# Patient Record
Sex: FEMALE | Race: WHITE
[De-identification: names, ages, dates, MRNs, and addresses within clinical notes are randomized per-mention and may not be internally consistent; named-entity substitution may affect disease eponyms.]

---

## 2018-02-14 ENCOUNTER — HOSPITAL ENCOUNTER (EMERGENCY)
Dept: HOSPITAL 45 - C.EDB | Age: 28
Discharge: HOME | End: 2018-02-14
Payer: COMMERCIAL

## 2018-02-14 VITALS
DIASTOLIC BLOOD PRESSURE: 96 MMHG | OXYGEN SATURATION: 95 % | SYSTOLIC BLOOD PRESSURE: 126 MMHG | HEART RATE: 85 BPM | TEMPERATURE: 98.78 F

## 2018-02-14 VITALS
WEIGHT: 293 LBS | HEIGHT: 65 IN | HEIGHT: 65 IN | WEIGHT: 293 LBS | BODY MASS INDEX: 48.82 KG/M2 | BODY MASS INDEX: 48.82 KG/M2

## 2018-02-14 DIAGNOSIS — Z82.49: ICD-10-CM

## 2018-02-14 DIAGNOSIS — Z87.891: ICD-10-CM

## 2018-02-14 DIAGNOSIS — Z84.1: ICD-10-CM

## 2018-02-14 DIAGNOSIS — Z87.442: ICD-10-CM

## 2018-02-14 DIAGNOSIS — Z79.899: ICD-10-CM

## 2018-02-14 DIAGNOSIS — R10.32: Primary | ICD-10-CM

## 2018-02-14 DIAGNOSIS — Z80.9: ICD-10-CM

## 2018-02-14 DIAGNOSIS — Z83.3: ICD-10-CM

## 2018-02-14 LAB
ALBUMIN SERPL-MCNC: 3.9 GM/DL (ref 3.4–5)
ALP SERPL-CCNC: 74 U/L (ref 45–117)
ALT SERPL-CCNC: 24 U/L (ref 12–78)
AST SERPL-CCNC: 13 U/L (ref 15–37)
BASOPHILS # BLD: 0.02 K/UL (ref 0–0.2)
BASOPHILS NFR BLD: 0.2 %
BUN SERPL-MCNC: 11 MG/DL (ref 7–18)
CALCIUM SERPL-MCNC: 9.1 MG/DL (ref 8.5–10.1)
CO2 SERPL-SCNC: 27 MMOL/L (ref 21–32)
CREAT SERPL-MCNC: 0.72 MG/DL (ref 0.6–1.2)
EOS ABS #: 0.16 K/UL (ref 0–0.5)
EOSINOPHIL NFR BLD AUTO: 307 K/UL (ref 130–400)
GLUCOSE SERPL-MCNC: 76 MG/DL (ref 70–99)
HCT VFR BLD CALC: 40.2 % (ref 37–47)
HGB BLD-MCNC: 13.4 G/DL (ref 12–16)
IG#: 0.02 K/UL (ref 0–0.02)
IMM GRANULOCYTES NFR BLD AUTO: 24.5 %
LYMPHOCYTES # BLD: 2.25 K/UL (ref 1.2–3.4)
MCH RBC QN AUTO: 26.6 PG (ref 25–34)
MCHC RBC AUTO-ENTMCNC: 33.3 G/DL (ref 32–36)
MCV RBC AUTO: 79.8 FL (ref 80–100)
MONO ABS #: 0.56 K/UL (ref 0.11–0.59)
MONOCYTES NFR BLD: 6.1 %
NEUT ABS #: 6.18 K/UL (ref 1.4–6.5)
NEUTROPHILS # BLD AUTO: 1.7 %
NEUTROPHILS NFR BLD AUTO: 67.3 %
PMV BLD AUTO: 9.4 FL (ref 7.4–10.4)
POTASSIUM SERPL-SCNC: 3.7 MMOL/L (ref 3.5–5.1)
PROT SERPL-MCNC: 8 GM/DL (ref 6.4–8.2)
RED CELL DISTRIBUTION WIDTH CV: 14.1 % (ref 11.5–14.5)
RED CELL DISTRIBUTION WIDTH SD: 40.6 FL (ref 36.4–46.3)
SODIUM SERPL-SCNC: 141 MMOL/L (ref 136–145)
WBC # BLD AUTO: 9.19 K/UL (ref 4.8–10.8)

## 2018-02-14 NOTE — DIAGNOSTIC IMAGING REPORT
PELVIC COMPLETE NON OB



CLINICAL HISTORY: left pelvic pain PAIN



COMPARISON STUDY:  1/7/2016



FINDINGS: 

The uterus measured 7 cm.

The endometrial stripe measured 2 mm.

The right ovary measured 3.1 cm with normal vascular flow.

The left ovary measured 2.5 cm with normal vascular flow.

There is no ultrasonographic evidence of ovarian torsion. It should be noted

that ovarian torsion can be present with normal Doppler ultrasonographic

findings.

There was no evidence of pathologic free pelvic fluid.



IMPRESSION:  Normal pelvic ultrasound









The above report was generated using voice recognition software.  It may contain

grammatical, syntax or spelling errors.







Electronically signed by:  Osiel Stewart M.D.

2/14/2018 7:17 PM



Dictated Date/Time:  2/14/2018 7:16 PM

## 2018-02-14 NOTE — DIAGNOSTIC IMAGING REPORT
K[facility]



CLINICAL HISTORY: left flank pain pain



COMPARISON STUDY:  11/30/2015 



FINDINGS: Nonobstructive bowel pattern. No evidence for nephrocalcinosis. Pelvic

vascular calcifications unchanged in the prior study.



IMPRESSION:  Negative study. 













The above report was generated using voice recognition software.  It may contain

grammatical, syntax or spelling errors.







Electronically signed by:  Osiel Stewart M.D.

2/14/2018 7:23 PM



Dictated Date/Time:  2/14/2018 7:22 PM

## 2018-02-14 NOTE — EMERGENCY ROOM VISIT NOTE
History


First contact with patient:  16:58


Chief Complaint:  KIDNEY STONE


Stated Complaint:  BACK PAIN, PELVIC CRAMPS, KIDNEY STONE





History of Present Illness


The patient is a 27 year old female who presents to the Emergency Room with 

complaints of "I think I have a kidney stone".  The patient is experiencing 

left lower quadrant abdominal pain which is radiating into her back.  She 

describes the pain as a squeezing sensation.  She states for the past 3 weeks, 

she has had dull pain in her abdomen, on the left side, but yesterday, the pain 

seemed to significantly worsen. The patient states she has history of kidney 

stones, and has had similar symptoms in the past.  She does report some dysuria 

which began last night.  She states this morning, she did have a fever of 101

F.  She did take some ibuprofen which helped with the fever and minimally 

helped with her pain.  The patient does state she got an influenza and Gardasil 

vaccinations yesterday, and is concerned that this could be a side effect.  She 

states she did also restart her Zoloft today.  The patient denies any nausea, 

vomiting, diarrhea, or constipation associated with her symptoms.  She states 

there has been some dysuria, but denies any urinary frequency, hesitancy, or 

hematuria. She denies any recent illness including upper respiratory infection, 

cough, sore throat, or other concerning symptoms.





Review of Systems


A complete 10 point review of systems was reviewed with the patient with 

pertinent positives and negatives as per history of present illness. All else 

were negative.





Past Medical/Surgical History


Medical Problems:


(1) Bronchitis


(2) Kidney stones


(3) Ureterolithiasis








Family History





Cancer


Diabetes mellitus


Gallbladder disease


Heart disease


Hypertension


Kidney disease


Kidney stones





Social History


Smoking Status:  Former Smoker


Alcohol Use:  none


Drug Use:  none


Marital Status:  single, in relationship


Housing Status:  lives with family


Occupation Status:  employed





Current/Historical Medications


Scheduled


Multivit/Min/Iron/Fol Ac/Pren (Prenatal Vitamin), 1 TAB PO DAILY


Sertraline HCl (Sertraline HCl), 25 MG PO DAILY





Scheduled PRN


Albuterol Hfa (Ventolin Hfa), 2 PUFFS INH UD PRN for SOB/Wheezing





Physical Exam


Vital Signs











  Date Time  Temp Pulse Resp B/P (MAP) Pulse Ox O2 Delivery O2 Flow Rate FiO2


 


2/14/18 19:45 37.1 85 18 126/96 95   


 


2/14/18 19:31  85 18 126/96 95 Room Air  


 


2/14/18 17:38  73 19 135/91 99 Room Air  


 


2/14/18 16:08 37.1 87 16 174/93 97 Room Air  











Physical Exam


VITALS: Vitals are noted on the nurse's note and reviewed by myself.  Vital 

signs stable.


GENERAL: This is a 27-year-old obese white female, in no acute distress, 

nondiaphoretic, well-developed well-nourished.


SKIN: The skin was without rashes, erythema, edema, or bruising.  There is no 

tenting of the skin.  Capillary reflex less than 2 seconds.


HEAD: Normocephalic atraumatic.  


EARS: External auditory canals clear, tympanic membranes pearly gray without 

erythema or effusion bilaterally.


EYES: Pupils equal round and reactive to light and accommodation.  Conjunctivae 

without injection, sclerae without icterus.  Extraocular movements intact.  


NOSE: Patent, turbinates without inflammation or discharge.  No sinus 

tenderness.


MOUTH: Mucous membranes moist.  Tonsils are not enlarged.  Pharynx without 

erythema or exudate.  Uvula midline.  Airway patent.  Tongue does not deviate.  


NECK: Supple without nuchal rigidity.  No lymphadenopathy.  No thyromegaly.  

Cervical spine is nontender.  No JVD.


HEART: Regular rate and rhythm without murmurs gallops or rubs.


LUNGS: Clear to auscultation bilaterally without wheezes, rales or rhonchi.  No 

dullness to percussion.  No retractions or accessory muscle use.


ABDOMEN: Positive bowel sounds x 4.  Normal tympanic percussion.  Tenderness of 

the left lower quadrant in the suprapubic region.  Soft, without masses or 

organomegaly.  Ordoñez sign negative.  No guarding or rebound tenderness.  

Positive CVA tenderness on the left.


MUSCULOSKELETAL: No muscle atrophy, erythema, or edema noted.  Full range of 

motion without joint tenderness in all extremities.  No tenderness to 

palpation.  Normal gait.  Strength 5/5 throughout.


NEURO: Patient was alert and oriented to person place and time.  Normal 

sensation to light and sharp touch.  Deep tendon reflexes 2+ throughout.  No 

focal neurological deficits.





Medical Decision & Procedures


ER Provider


Diagnostic Interpretation:


KUB





CLINICAL HISTORY: left flank pain pain





COMPARISON STUDY:  11/30/2015 





FINDINGS: Nonobstructive bowel pattern. No evidence for nephrocalcinosis. Pelvic


vascular calcifications unchanged in the prior study.





IMPRESSION:  Negative study. 




















The above report was generated using voice recognition software.  It may contain


grammatical, syntax or spelling errors.











Electronically signed by:  Osiel Stewart M.D.


2/14/2018 7:23 PM





Dictated Date/Time:  2/14/2018 7:22 PM








PELVIC COMPLETE NON OB





CLINICAL HISTORY: left pelvic pain PAIN





COMPARISON STUDY:  1/7/2016





FINDINGS: 


The uterus measured 7 cm.


The endometrial stripe measured 2 mm.


The right ovary measured 3.1 cm with normal vascular flow.


The left ovary measured 2.5 cm with normal vascular flow.


There is no ultrasonographic evidence of ovarian torsion. It should be noted


that ovarian torsion can be present with normal Doppler ultrasonographic


findings.


There was no evidence of pathologic free pelvic fluid.





IMPRESSION:  Normal pelvic ultrasound














The above report was generated using voice recognition software.  It may contain


grammatical, syntax or spelling errors.











Electronically signed by:  Osiel Stewart M.D.


2/14/2018 7:17 PM





Dictated Date/Time:  2/14/2018 7:16 PM





Laboratory Results


2/14/18 17:58








Red Blood Count 5.04, Mean Corpuscular Volume 79.8, Mean Corpuscular Hemoglobin 

26.6, Mean Corpuscular Hemoglobin Concent 33.3, Mean Platelet Volume 9.4, 

Neutrophils (%) (Auto) 67.3, Lymphocytes (%) (Auto) 24.5, Monocytes (%) (Auto) 

6.1, Eosinophils (%) (Auto) 1.7, Basophils (%) (Auto) 0.2, Neutrophils # (Auto) 

6.18, Lymphocytes # (Auto) 2.25, Monocytes # (Auto) 0.56, Eosinophils # (Auto) 

0.16, Basophils # (Auto) 0.02





2/14/18 17:58

















Test


  2/14/18


17:10 2/14/18


17:58


 


Urine Color YELLOW  


 


Urine Appearance CLEAR (CLEAR)  


 


Urine pH 5.0 (4.5-7.5)  


 


Urine Specific Gravity


  1.023


(1.000-1.030) 


 


 


Urine Protein NEG (NEG)  


 


Urine Glucose (UA) NEG (NEG)  


 


Urine Ketones NEG (NEG)  


 


Urine Occult Blood NEG (NEG)  


 


Urine Nitrite NEG (NEG)  


 


Urine Bilirubin NEG (NEG)  


 


Urine Urobilinogen NEG (NEG)  


 


Urine Leukocyte Esterase NEG (NEG)  


 


White Blood Count


  


  9.19 K/uL


(4.8-10.8)


 


Red Blood Count


  


  5.04 M/uL


(4.2-5.4)


 


Hemoglobin


  


  13.4 g/dL


(12.0-16.0)


 


Hematocrit  40.2 % (37-47) 


 


Mean Corpuscular Volume


  


  79.8 fL


()


 


Mean Corpuscular Hemoglobin


  


  26.6 pg


(25-34)


 


Mean Corpuscular Hemoglobin


Concent 


  33.3 g/dl


(32-36)


 


Platelet Count


  


  307 K/uL


(130-400)


 


Mean Platelet Volume


  


  9.4 fL


(7.4-10.4)


 


Neutrophils (%) (Auto)  67.3 % 


 


Lymphocytes (%) (Auto)  24.5 % 


 


Monocytes (%) (Auto)  6.1 % 


 


Eosinophils (%) (Auto)  1.7 % 


 


Basophils (%) (Auto)  0.2 % 


 


Neutrophils # (Auto)


  


  6.18 K/uL


(1.4-6.5)


 


Lymphocytes # (Auto)


  


  2.25 K/uL


(1.2-3.4)


 


Monocytes # (Auto)


  


  0.56 K/uL


(0.11-0.59)


 


Eosinophils # (Auto)


  


  0.16 K/uL


(0-0.5)


 


Basophils # (Auto)


  


  0.02 K/uL


(0-0.2)


 


RDW Standard Deviation


  


  40.6 fL


(36.4-46.3)


 


RDW Coefficient of Variation


  


  14.1 %


(11.5-14.5)


 


Immature Granulocyte % (Auto)  0.2 % 


 


Immature Granulocyte # (Auto)


  


  0.02 K/uL


(0.00-0.02)


 


Anion Gap


  


  7.0 mmol/L


(3-11)


 


Est Creatinine Clear Calc


Drug Dose 


  176.1 ml/min 


 


 


Estimated GFR (


American) 


  134.0 


 


 


Estimated GFR (Non-


American 


  115.6 


 


 


BUN/Creatinine Ratio  15.4 (10-20) 


 


Calcium Level


  


  9.1 mg/dl


(8.5-10.1)


 


Total Bilirubin


  


  0.2 mg/dl


(0.2-1)


 


Aspartate Amino Transf


(AST/SGOT) 


  13 U/L (15-37) 


 


 


Alanine Aminotransferase


(ALT/SGPT) 


  24 U/L (12-78) 


 


 


Alkaline Phosphatase


  


  74 U/L


()


 


Total Protein


  


  8.0 gm/dl


(6.4-8.2)


 


Albumin


  


  3.9 gm/dl


(3.4-5.0)


 


Globulin


  


  4.1 gm/dl


(2.5-4.0)


 


Albumin/Globulin Ratio  0.9 (0.9-2) 











Medications Administered











 Medications


  (Trade)  Dose


 Ordered  Sig/Dave


 Route  Start Time


 Stop Time Status Last Admin


Dose Admin


 


 Ketorolac


 Tromethamine


  (Toradol Inj)  30 mg  NOW  STAT


 IV  2/14/18 17:15


 2/14/18 17:22 DC 2/14/18 17:36


30 MG


 


 Sodium Chloride  1,000 ml @ 


 999 mls/hr  Q1H1M STAT


 IV  2/14/18 17:15


 2/14/18 18:15 DC 2/14/18 17:15


999 MLS/HR


 


 Tramadol HCl


  (Ultram Tab)  50 mg  NOW  STAT


 PO  2/14/18 19:35


 2/14/18 19:36 DC 2/14/18 19:40


50 MG











ED Course


Patient was seen and evaluated as above.





IV access obtained, labs drawn.  The patient was given 30 mg Toradol and 1 L 

normal saline solution IV.





KUB and Pelvis U/S ordered and performed.  This was reviewed by myself and the 

radiologist.





I discussed the findings of testing with the patient at bedside.  She continues 

to complain of pain.  She was given 50 mg tramadol p.o.





Discharge instructions reviewed, the patient was discharged home in good 

condition.





Medical Decision


This is a 27-year-old female patient presents to the emergency department today 

complaining of left lower quadrant abdominal pain radiating into the back which 

is consistent with prior pain she has had with kidney stones.  The patient's 

workup here in the emergency department was overall negative for stone.  

Urinalysis did not show any blood or signs of infection. CBC without 

leukocytosis, anemia, thrombocytopenia.  Urine pregnancy test was negative.  

CMP did not show any renal, hepatic, or electrolyte abnormalities.  The patient 

states she has also had a history of ovarian cyst which seemed to present 

similarly to kidney stone in the past.  Ultrasound of the pelvis was negative 

for ovarian cyst.  I did discuss options for further evaluation with the 

patient including CT scan of the abdomen and pelvis, but did discuss with her 

the benefits versus risks associated with this scan.  As her labs do not reveal 

any abnormalities, I do not feel that this test is necessary at this time.  I 

am uncertain exactly as to the etiology of her abdominal pain, but I did 

encourage close follow-up with strict return precautions.  The patient is 

agreeable to this assessment and plan, and decides that she does not wish to 

have the CT scan performed.  She states she is in slightly more pain since 

having the transvaginal ultrasound, and does request pain medication.  She was 

given a dose of tramadol and discharged home in good condition.





Etiologies such as appendicitis, diverticulitis,  obstruction, inflammatory 

bowel disease, renal colic, PUD, biliary pathology, pancreatitis, mesenteric 

ischemia, aortic pathology, infections, genitourinary, UTI, perforated viscus, 

as well as others were entertained.





Medication Reconcilliation


Current Medication List:  was personally reviewed by me





Blood Pressure Screening


Patient's blood pressure:  Normal blood pressure





Impression





 Primary Impression:  


 Abdominal pain





Departure Information


Dispostion


Home / Self-Care





Condition


GOOD





Referrals


No Doctor, Assigned (PCP)





Patient Instructions


ED Abdominal Pain Unkn Cause, My [facility]





Additional Instructions





You have been treated in the Emergency Department your Abdominal Pain. 

Laboratory results and imaging studies have ruled out any emergent causes for 

your abdominal pain which would warrant admission or surgery.  There is no 

ovarian cyst noted on ultrasound, nor was there a stone noted on KUB x-ray.





For pain control, you can use the following over-the-counter medicines (if >13 yo):


Ibuprofen(Motrin, Advil) may be used for fever or pain.  Use 600mg every six 

hours as needed.  Take with food.  Avoid using more than 2400mg in a 24 hour 

period.  Do not use 2400mg per day for more than three consecutive days without 

physician direction.  Prolonged inappropriate use can lead to stomach upset or 

ulcers. 


(AND/OR)


Acetaminophen(Tylenol) may be used for fever or pain.  Use 1000mg every six 

hours as needed.  Avoid using more than 3000mg in a 24 hour period.  





Drink plenty of water and stay well hydrated. 





As with any trip to the Emergency Department, you should follow-up with your 

Primary Care Provider from today's visit.





Return to the emergency department if your symptoms persist despite treatment 

plan outlined above or if the following symptoms occur: worsening abdominal pain

, increased fevers, chills, worsening nausea/vomiting, blood in your stool or 

urine.





Problem Qualifiers








 Primary Impression:  


 Abdominal pain


 Abdominal location:  left lower quadrant  Qualified Codes:  R10.32 - Left 

lower quadrant pain

## 2018-08-06 ENCOUNTER — HOSPITAL ENCOUNTER (EMERGENCY)
Dept: HOSPITAL 45 - C.EDB | Age: 28
LOS: 1 days | Discharge: HOME | End: 2018-08-07
Payer: COMMERCIAL

## 2018-08-06 VITALS
HEIGHT: 64.02 IN | BODY MASS INDEX: 50.02 KG/M2 | WEIGHT: 293 LBS | BODY MASS INDEX: 50.02 KG/M2 | WEIGHT: 293 LBS | HEIGHT: 64.02 IN

## 2018-08-06 VITALS — TEMPERATURE: 97.88 F

## 2018-08-06 DIAGNOSIS — S00.511A: ICD-10-CM

## 2018-08-06 DIAGNOSIS — R09.81: ICD-10-CM

## 2018-08-06 DIAGNOSIS — R60.0: ICD-10-CM

## 2018-08-06 DIAGNOSIS — Z87.891: ICD-10-CM

## 2018-08-06 DIAGNOSIS — E86.0: ICD-10-CM

## 2018-08-06 DIAGNOSIS — S06.0X1A: ICD-10-CM

## 2018-08-06 DIAGNOSIS — R55: Primary | ICD-10-CM
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
## 2018-08-06 DIAGNOSIS — Z88.6: ICD-10-CM

## 2018-08-06 NOTE — EMERGENCY ROOM VISIT NOTE
History


Report prepared by Lucian:  Adia Akins


Under the Supervision of:  Dr. Quincy Painter M.D.


First contact with patient:  23:11


Chief Complaint:  SYNCOPE


Stated Complaint:  HIT HEAD LOOPY





History of Present Illness


The patient is a 27 year old female who presents to the Emergency Room with 

complaints of a resolved episode of syncope that occurred about an hour prior 

to arrival. The patient's  reports he heard a "thud" and then went 

upstairs to find her on the floor with blood coming from her face.  The patient 

states she has a headache and her neck is sore. She rates her pain a 6/10 in 

severity. She also complains of chest tightness, nausea, nasal congestion, and 

more swelling in her feet. She states she has had a cold for the last few days. 

She reports she has been taking Zyrtec but has run out. She denies abdominal 

pain. She denies a history of seizures or blood clots. She notes there is a 

family history of blood clots. She states she has a history of asthma. The 

patient reports she was recently diagnosed with C-Diff and is taking 

Vancomycin. She denies diarrhea. She states she has not been drinking a lot of 

fluids.





   Source of History:  patient


   Onset:  an hour PTA


   Position:  head


   Symptom Intensity:  6/10


   Timing:  resolved


   Associated Symptoms:  + headache, + neck pain, + chest pain, + nausea, No 

abdominal pain, No diarrhea





Review of Systems


See HPI for pertinent positives & negatives. A total of 10 systems reviewed and 

were otherwise negative.





Past Medical & Surgical


Medical Problems:


(1) Bronchitis


(2) Kidney stones


(3) Ureterolithiasis








Family History





Cancer


Diabetes mellitus


Gallbladder disease


Heart disease


Hypertension


Kidney disease


Kidney stones





Social History


Smoking Status:  Former Smoker


Alcohol Use:  none


Drug Use:  none


Marital Status:  single, in relationship


Housing Status:  lives with family


Occupation Status:  employed





Current/Historical Medications


Scheduled


Cephalexin Monohydrate (Keflex), 500 MG PO BID


Sertraline HCl (Sertraline HCl), 25 MG PO DAILY


Vancomycin HCl (Firvanq), 50 MG PO AS DIRECTED





Scheduled PRN


Acetaminophen (Tylenol), 650 MG PO Q4 PRN for Pain


Albuterol Hfa (Ventolin Hfa), 2 PUFFS INH UD PRN for SOB/Wheezing


Ondansetron Hcl (Zofran), 4 MG PO Q6 PRN for Nausea





Allergies


Coded Allergies:  


     Diphenhydramine (Verified  Allergy, Intermediate, hives, 8/6/18)





Physical Exam


Vital Signs











  Date Time  Temp Pulse Resp B/P (MAP) Pulse Ox O2 Delivery O2 Flow Rate FiO2


 


8/7/18 01:40  80 20 123/63 98   


 


8/7/18 00:59  77 20 113/58 97 Room Air  


 


8/7/18 00:19  80 22 101/58 96 Room Air  


 


8/6/18 23:04 36.6 86 18 147/85 99 Room Air  











Physical Exam


GENERAL: Patient is mildy anxious appearing and in no acute distress.


EYES: No scleral icterus, unremarkable pupils.


ENT: Mucous membranes moist, no nasal congestion. Abrasion of the left lower 

lip. 


NECK: No masses appreciated, no meningismus, trachea is midline.


RESPIRATORY: No dyspnea. Clear to auscultation and equal bilaterally. No wheeze

, no rhonchi.


CARDIOVASCULAR: Regular rate and rhythm.  No murmurs, rubs, gallops appreciated.


GASTROINTESTINAL: Abdomen soft, nontender, no peritonitis.  Bowel sounds 

positive.  No masses appreciated.


BACK: No midline tenderness, no CVA tenderness


EXTREMITIES: Normal motion all extremities, no cyanosis. Bilateral foot edema. 


NEUROLOGIC: Alert and oriented, no acute motor or sensory deficits, no focal 

weakness, cranial nerves grossly intact.


SKIN: No rash, no jaundice, no diaphoresis.





Medical Decision & Procedures


ER Provider


Diagnostic Interpretation:


Stat Rad Radiology results and stated below per my review and radiologist 

interpretation:


CT HEAD:


No ICH, mass effect or edema. No evidence for cortical infarct or significant 

interval change from 4/10/2016.


No skull fracture. Paranasal sinuses and mastoid air cells are unremarkable. No 

significant overlying soft tissue abnormality. 


Radiologist:   Jerome Neil MD





Laboratory Results


8/6/18 23:55








Red Blood Count 4.65, Mean Corpuscular Volume 80.6, Mean Corpuscular Hemoglobin 

26.2, Mean Corpuscular Hemoglobin Concent 32.5, Mean Platelet Volume 9.8, 

Neutrophils (%) (Auto) 64.9, Lymphocytes (%) (Auto) 22.6, Monocytes (%) (Auto) 

7.2, Eosinophils (%) (Auto) 4.9, Basophils (%) (Auto) 0.2, Neutrophils # (Auto) 

5.78, Lymphocytes # (Auto) 2.02, Monocytes # (Auto) 0.64, Eosinophils # (Auto) 

0.44, Basophils # (Auto) 0.02





8/7/18 00:39

















Test


  8/6/18


23:15 8/6/18


23:55 8/7/18


00:39


 


Urine Color YELLOW   


 


Urine Appearance CLEAR (CLEAR)   


 


Urine pH 5.0 (4.5-7.5)   


 


Urine Specific Gravity


  1.025


(1.000-1.030) 


  


 


 


Urine Protein NEG (NEG)   


 


Urine Glucose (UA) NEG (NEG)   


 


Urine Ketones NEG (NEG)   


 


Urine Occult Blood NEG (NEG)   


 


Urine Nitrite NEG (NEG)   


 


Urine Bilirubin NEG (NEG)   


 


Urine Urobilinogen NEG (NEG)   


 


Urine Leukocyte Esterase SMALL (NEG)   


 


Urine WBC (Auto)


  10-30 /hpf


(0-5) 


  


 


 


Urine RBC (Auto) 0-4 /hpf (0-4)   


 


Urine Hyaline Casts (Auto) 1-5 /lpf (0-5)   


 


Urine Epithelial Cells (Auto) >30 /lpf (0-5)   


 


Urine Bacteria (Auto) 1+ (NEG)   


 


Urine Crystals


  CALCIUM


OXALATE (NONE 


  


 


 


Urine Pregnancy Test NEG (NEG)   


 


White Blood Count


  


  8.92 K/uL


(4.8-10.8) 


 


 


Red Blood Count


  


  4.65 M/uL


(4.2-5.4) 


 


 


Hemoglobin


  


  12.2 g/dL


(12.0-16.0) 


 


 


Hematocrit  37.5 % (37-47)  


 


Mean Corpuscular Volume


  


  80.6 fL


() 


 


 


Mean Corpuscular Hemoglobin


  


  26.2 pg


(25-34) 


 


 


Mean Corpuscular Hemoglobin


Concent 


  32.5 g/dl


(32-36) 


 


 


Platelet Count


  


  289 K/uL


(130-400) 


 


 


Mean Platelet Volume


  


  9.8 fL


(7.4-10.4) 


 


 


Neutrophils (%) (Auto)  64.9 %  


 


Lymphocytes (%) (Auto)  22.6 %  


 


Monocytes (%) (Auto)  7.2 %  


 


Eosinophils (%) (Auto)  4.9 %  


 


Basophils (%) (Auto)  0.2 %  


 


Neutrophils # (Auto)


  


  5.78 K/uL


(1.4-6.5) 


 


 


Lymphocytes # (Auto)


  


  2.02 K/uL


(1.2-3.4) 


 


 


Monocytes # (Auto)


  


  0.64 K/uL


(0.11-0.59) 


 


 


Eosinophils # (Auto)


  


  0.44 K/uL


(0-0.5) 


 


 


Basophils # (Auto)


  


  0.02 K/uL


(0-0.2) 


 


 


RDW Standard Deviation


  


  40.4 fL


(36.4-46.3) 


 


 


RDW Coefficient of Variation


  


  13.9 %


(11.5-14.5) 


 


 


Immature Granulocyte % (Auto)  0.2 %  


 


Immature Granulocyte # (Auto)


  


  0.02 K/uL


(0.00-0.02) 


 


 


D-Dimer


  


  


  450 ug/L FEU


(0-500)


 


Anion Gap


  


  


  8.0 mmol/L


(3-11)


 


Est Creatinine Clear Calc


Drug Dose 


  


  183.7 ml/min 


 


 


Estimated GFR (


American) 


  


  139.9 


 


 


Estimated GFR (Non-


American 


  


  120.7 


 


 


BUN/Creatinine Ratio   18.6 (10-20) 


 


Calcium Level


  


  


  8.1 mg/dl


(8.5-10.1)


 


Troponin I


  


  


  < 0.015 ng/ml


(0-0.045)





Laboratory results as reviewed by me.





Medications Administered











 Medications


  (Trade)  Dose


 Ordered  Sig/Dave


 Route  Start Time


 Stop Time Status Last Admin


Dose Admin


 


 Sodium Chloride  1,000 ml @ 


 999 mls/hr  Q1H1M STAT


 IV  8/6/18 23:19


 8/7/18 00:19 DC 8/7/18 00:19


999 MLS/HR


 


 Ketorolac


 Tromethamine


  (Toradol Inj)  30 mg  NOW  STAT


 IV  8/7/18 01:27


 8/7/18 01:28 DC 8/7/18 01:33


30 MG











ECG Per My Interpretation


Indication:  syncope


Rate (beats per minute):  80


Rhythm:  normal sinus


Findings:  no acute ischemic change, no ectopy, other ()





ED Course


2315: The patient was evaluated in room B7. A complete history and physical 

exam was performed.





0130:  I rechecked the patient. She is feeling better. She states she still has 

headache. I discussed post concussion instructions and syncope instructions. 





0135: Reevaluated the patient. Discussed results and discharge instructions: 

She verbalized understanding and agreement.   The patient is ready for 

discharge.





Medical Decision


Differential: Vaso-vagal, Intracerebral Event, Neurologic, Infectious, Volume 

Deficiency, Hypoglycemia, Electrolyte Abnormality, Cardiac Source, Toxicologic, 

amongst other pathologies entertained.





27 yr old female arrives with complaint of syncopal event, striking lip and 

mild edema bilateral feet.  Just finished course abx for cdiff and has recent 

sinus congestion.  CT head negative.  Dimer negative and legs do not appear 

very much swollen.  No evidence this is acs.  Electrolytes look OK.  Suspect 

some dehydration leading to syncope with standing.  She is feeling much better 

after fluids.  She will rest and we discussed at length post head injury 

instructions.





Medication Reconcilliation


Current Medication List:  was personally reviewed by me





Blood Pressure Screening


Patient's blood pressure:  Normal blood pressure





Impression





 Primary Impression:  


 Head injury, closed, with brief LOC


 Additional Impressions:  


 Concussion


 Abrasion of lip


 Dehydration


 Sinus congestion





Scribe Attestation


The scribe's documentation has been prepared under my direction and personally 

reviewed by me in its entirety. I confirm that the note above accurately 

reflects all work, treatment, procedures, and medical decision making performed 

by me.





Departure Information


Dispostion


Home / Self-Care





Referrals


No Doctor, Assigned (PCP)





Patient Instructions


Concussion, Fainting (Syncope) - [facility], My [facility]





Problem Qualifiers

## 2018-08-07 VITALS — SYSTOLIC BLOOD PRESSURE: 123 MMHG | OXYGEN SATURATION: 98 % | HEART RATE: 80 BPM | DIASTOLIC BLOOD PRESSURE: 63 MMHG

## 2018-08-07 LAB
BASOPHILS # BLD: 0.02 K/UL (ref 0–0.2)
BASOPHILS NFR BLD: 0.2 %
BUN SERPL-MCNC: 13 MG/DL (ref 7–18)
CALCIUM SERPL-MCNC: 8.1 MG/DL (ref 8.5–10.1)
CO2 SERPL-SCNC: 25 MMOL/L (ref 21–32)
CREAT SERPL-MCNC: 0.68 MG/DL (ref 0.6–1.2)
EOS ABS #: 0.44 K/UL (ref 0–0.5)
EOSINOPHIL NFR BLD AUTO: 289 K/UL (ref 130–400)
GLUCOSE SERPL-MCNC: 125 MG/DL (ref 70–99)
HCT VFR BLD CALC: 37.5 % (ref 37–47)
HGB BLD-MCNC: 12.2 G/DL (ref 12–16)
IG#: 0.02 K/UL (ref 0–0.02)
IMM GRANULOCYTES NFR BLD AUTO: 22.6 %
LYMPHOCYTES # BLD: 2.02 K/UL (ref 1.2–3.4)
MCH RBC QN AUTO: 26.2 PG (ref 25–34)
MCHC RBC AUTO-ENTMCNC: 32.5 G/DL (ref 32–36)
MCV RBC AUTO: 80.6 FL (ref 80–100)
MONO ABS #: 0.64 K/UL (ref 0.11–0.59)
MONOCYTES NFR BLD: 7.2 %
NEUT ABS #: 5.78 K/UL (ref 1.4–6.5)
NEUTROPHILS # BLD AUTO: 4.9 %
NEUTROPHILS NFR BLD AUTO: 64.9 %
PMV BLD AUTO: 9.8 FL (ref 7.4–10.4)
POTASSIUM SERPL-SCNC: 3.5 MMOL/L (ref 3.5–5.1)
RED CELL DISTRIBUTION WIDTH CV: 13.9 % (ref 11.5–14.5)
RED CELL DISTRIBUTION WIDTH SD: 40.4 FL (ref 36.4–46.3)
SODIUM SERPL-SCNC: 142 MMOL/L (ref 136–145)
WBC # BLD AUTO: 8.92 K/UL (ref 4.8–10.8)

## 2018-08-07 NOTE — DIAGNOSTIC IMAGING REPORT
CT OF THE HEAD WITHOUT CONTRAST



CLINICAL HISTORY: Syncope. Head injury.    



COMPARISON STUDY:  Head CT April 10, 2016. 



CT DOSE: 537.48 mGy.cm



TECHNIQUE: Helical axial images of the head were obtained without IV contrast.

Automated exposure control was utilized for the study.  A dose lowering

technique was utilized adhering to the principles of ALARA.





FINDINGS: No acute intracranial hemorrhage, midline shift or mass effect is

present. Ventricular system is unremarkable. Basilar cisterns are patent. There

are no extra-axial collections. Gray-white differentiation is maintained. There

is no calvarial fracture.



IMPRESSION:  



1. No acute intracranial findings.



2. No calvarial fracture. 







Electronically signed by:  Joshua Butt M.D.

8/7/2018 7:03 AM



Dictated Date/Time:  8/7/2018 7:01 AM

## 2018-08-12 ENCOUNTER — HOSPITAL ENCOUNTER (EMERGENCY)
Dept: HOSPITAL 45 - C.EDB | Age: 28
Discharge: HOME | End: 2018-08-12
Payer: COMMERCIAL

## 2018-08-12 VITALS — OXYGEN SATURATION: 98 %

## 2018-08-12 VITALS
HEIGHT: 64.02 IN | HEIGHT: 64.02 IN | WEIGHT: 293 LBS | BODY MASS INDEX: 50.02 KG/M2 | WEIGHT: 293 LBS | BODY MASS INDEX: 50.02 KG/M2

## 2018-08-12 VITALS — TEMPERATURE: 97.52 F

## 2018-08-12 VITALS — HEART RATE: 79 BPM | SYSTOLIC BLOOD PRESSURE: 121 MMHG | DIASTOLIC BLOOD PRESSURE: 85 MMHG | OXYGEN SATURATION: 97 %

## 2018-08-12 DIAGNOSIS — J45.41: Primary | ICD-10-CM

## 2018-08-12 DIAGNOSIS — Z87.09: ICD-10-CM

## 2018-08-12 LAB
ALBUMIN SERPL-MCNC: 3.9 GM/DL (ref 3.4–5)
ALP SERPL-CCNC: 78 U/L (ref 45–117)
ALT SERPL-CCNC: 26 U/L (ref 12–78)
AST SERPL-CCNC: 12 U/L (ref 15–37)
BASOPHILS # BLD: 0.02 K/UL (ref 0–0.2)
BASOPHILS NFR BLD: 0.2 %
BUN SERPL-MCNC: 14 MG/DL (ref 7–18)
CALCIUM SERPL-MCNC: 9.1 MG/DL (ref 8.5–10.1)
CO2 SERPL-SCNC: 23 MMOL/L (ref 21–32)
CREAT SERPL-MCNC: 0.92 MG/DL (ref 0.6–1.2)
EOS ABS #: 0.24 K/UL (ref 0–0.5)
EOSINOPHIL NFR BLD AUTO: 239 K/UL (ref 130–400)
GLUCOSE SERPL-MCNC: 113 MG/DL (ref 70–99)
HCT VFR BLD CALC: 36.9 % (ref 37–47)
HGB BLD-MCNC: 12.1 G/DL (ref 12–16)
IG#: 0.01 K/UL (ref 0–0.02)
IMM GRANULOCYTES NFR BLD AUTO: 22.1 %
LYMPHOCYTES # BLD: 1.94 K/UL (ref 1.2–3.4)
MCH RBC QN AUTO: 26.5 PG (ref 25–34)
MCHC RBC AUTO-ENTMCNC: 32.8 G/DL (ref 32–36)
MCV RBC AUTO: 80.7 FL (ref 80–100)
MONO ABS #: 0.66 K/UL (ref 0.11–0.59)
MONOCYTES NFR BLD: 7.5 %
NEUT ABS #: 5.92 K/UL (ref 1.4–6.5)
NEUTROPHILS # BLD AUTO: 2.7 %
NEUTROPHILS NFR BLD AUTO: 67.4 %
PMV BLD AUTO: 9.7 FL (ref 7.4–10.4)
POTASSIUM SERPL-SCNC: 3.7 MMOL/L (ref 3.5–5.1)
PROT SERPL-MCNC: 8.2 GM/DL (ref 6.4–8.2)
RED CELL DISTRIBUTION WIDTH CV: 13.9 % (ref 11.5–14.5)
RED CELL DISTRIBUTION WIDTH SD: 41 FL (ref 36.4–46.3)
SODIUM SERPL-SCNC: 140 MMOL/L (ref 136–145)
WBC # BLD AUTO: 8.79 K/UL (ref 4.8–10.8)

## 2018-08-12 NOTE — EMERGENCY ROOM VISIT NOTE
History


First contact with patient:  02:51


Chief Complaint:  RESPIRATORY PROBLEMS


Stated Complaint:  CAN'T BREATHE





History of Present Illness


The patient is a 27 year old female who presents to the Emergency Room with 

complaints of cough, wheezing with shortness of breath and chest pain for the 

past day.  Patient feels like she is having her bronchitis with asthma again.  

Patient states the coughing more.  No fevers.  She does not smoke.  Patient 

denies exertional chest pain, abdominal pain, leg pain or swelling, headache, 

sore throat, back pain, lightheadedness or dizziness.  She is tolerating p.o. 

fluids and food.





Review of Systems


An 10 system review of systems was completed with positives and pertinent 

negatives listed in the HPI.





Past Medical/Surgical History


Medical Problems:


(1) Bronchitis


(2) Kidney stones


(3) Ureterolithiasis


Asthma





Family History





Cancer


Diabetes mellitus


Gallbladder disease


Heart disease


Hypertension


Kidney disease


Kidney stones





Social History


Smoking Status:  Never Smoker


Alcohol Use:  none


Drug Use:  none


Marital Status:  single, in relationship


Housing Status:  lives with family


Occupation Status:  employed





Current/Historical Medications


Scheduled


Cephalexin Monohydrate (Keflex), 500 MG PO BID


Sertraline HCl (Sertraline HCl), 25 MG PO DAILY


Vancomycin HCl (Firvanq), 50 MG PO AS DIRECTED





Scheduled PRN


Acetaminophen (Tylenol), 650 MG PO Q4 PRN for Pain


Albuterol Hfa (Ventolin Hfa), 2 PUFFS INH UD PRN for SOB/Wheezing


Ondansetron Hcl (Zofran), 4 MG PO Q6 PRN for Nausea





Physical Exam


Vital Signs











  Date Time  Temp Pulse Resp B/P (MAP) Pulse Ox O2 Delivery O2 Flow Rate FiO2


 


8/12/18 02:56     98 Room Air  


 


8/12/18 02:56  82      


 


8/12/18 02:50     94 Room Air  


 


8/12/18 02:45 36.4 92 28 116/65 95 Room Air  











Physical Exam


   


PHYSICAL EXAM: Vital Signs: Reviewed Nurse's notes. Oxygen saturation was  95% 

on room air. GENERAL: White female anxious appearing, Alert, oriented and 

coherent.  The patient is able to speak in complete sentences. NECK: Supple, non

-tender.  CHEST:  Symmetrical expansion. no retractions no accessory muscle 

use.  HEART: Regular rate and normal heart sounds, no murmur, gallop or rub.  

LUNGS: Breath sounds equal but significantly diminished in intensity on both 

sides.  Bilateral wheezes heard but no rales or pleuritic rub. 


SKIN: The skin was without rashes, erythema, edema, or bruising.  There is no 

tenting of the skin.  Capillary reflex less than 2 seconds.


HEAD: Normocephalic atraumatic.  


EARS: External auditory canals clear, tympanic membranes pearly gray without 

erythema or effusion bilaterally.


EYES: Pupils equal round and reactive to light and accommodation.  Conjunctivae 

without injection, sclerae without icterus.  Extraocular movements intact.  


NOSE: Patent, turbinates without inflammation or discharge.  No sinus 

tenderness.


MOUTH: Mucous membranes moist.  Pharynx without erythema or exudate.  Uvula 

midline.  Airway patent.  Tongue does not deviate.  


ABDOMEN: Positive bowel sounds x 4.  Normal tympanic percussion.  Soft, 

protuberant, obese, nontender, without masses or organomegaly.  Ordoñez sign 

negative.  No guarding or rebound tenderness.


MUSCULOSKELETAL: No muscle atrophy, erythema, or edema noted.   


NEURO: Patient was alert and oriented to person place and time.  Normal 

sensation to light and sharp touch.   No focal neurological deficits.





Medical Decision & Procedures


Laboratory Results


8/12/18 03:55








Red Blood Count 4.57, Mean Corpuscular Volume 80.7, Mean Corpuscular Hemoglobin 

26.5, Mean Corpuscular Hemoglobin Concent 32.8, Mean Platelet Volume 9.7, 

Neutrophils (%) (Auto) 67.4, Lymphocytes (%) (Auto) 22.1, Monocytes (%) (Auto) 

7.5, Eosinophils (%) (Auto) 2.7, Basophils (%) (Auto) 0.2, Neutrophils # (Auto) 

5.92, Lymphocytes # (Auto) 1.94, Monocytes # (Auto) 0.66, Eosinophils # (Auto) 

0.24, Basophils # (Auto) 0.02





8/12/18 03:10

















Test


  8/12/18


03:10 8/12/18


03:55


 


Anion Gap


  9.0 mmol/L


(3-11) 


 


 


Est Creatinine Clear Calc


Drug Dose 135.3 ml/min 


  


 


 


Estimated GFR (


American) 99.6 


  


 


 


Estimated GFR (Non-


American 85.9 


  


 


 


BUN/Creatinine Ratio 15.7 (10-20)  


 


Calcium Level


  9.1 mg/dl


(8.5-10.1) 


 


 


Total Bilirubin


  0.3 mg/dl


(0.2-1) 


 


 


Direct Bilirubin


  < 0.1 mg/dl


(0-0.2) 


 


 


Aspartate Amino Transf


(AST/SGOT) 12 U/L (15-37) 


  


 


 


Alanine Aminotransferase


(ALT/SGPT) 26 U/L (12-78) 


  


 


 


Alkaline Phosphatase


  78 U/L


() 


 


 


Troponin I


  < 0.015 ng/ml


(0-0.045) 


 


 


Total Protein


  8.2 gm/dl


(6.4-8.2) 


 


 


Albumin


  3.9 gm/dl


(3.4-5.0) 


 


 


Human Chorionic Gonadotropin,


Qual NEG (NEG) 


  


 


 


White Blood Count


  


  8.79 K/uL


(4.8-10.8)


 


Red Blood Count


  


  4.57 M/uL


(4.2-5.4)


 


Hemoglobin


  


  12.1 g/dL


(12.0-16.0)


 


Hematocrit  36.9 % (37-47) 


 


Mean Corpuscular Volume


  


  80.7 fL


()


 


Mean Corpuscular Hemoglobin


  


  26.5 pg


(25-34)


 


Mean Corpuscular Hemoglobin


Concent 


  32.8 g/dl


(32-36)


 


Platelet Count


  


  239 K/uL


(130-400)


 


Mean Platelet Volume


  


  9.7 fL


(7.4-10.4)


 


Neutrophils (%) (Auto)  67.4 % 


 


Lymphocytes (%) (Auto)  22.1 % 


 


Monocytes (%) (Auto)  7.5 % 


 


Eosinophils (%) (Auto)  2.7 % 


 


Basophils (%) (Auto)  0.2 % 


 


Neutrophils # (Auto)


  


  5.92 K/uL


(1.4-6.5)


 


Lymphocytes # (Auto)


  


  1.94 K/uL


(1.2-3.4)


 


Monocytes # (Auto)


  


  0.66 K/uL


(0.11-0.59)


 


Eosinophils # (Auto)


  


  0.24 K/uL


(0-0.5)


 


Basophils # (Auto)


  


  0.02 K/uL


(0-0.2)


 


RDW Standard Deviation


  


  41.0 fL


(36.4-46.3)


 


RDW Coefficient of Variation


  


  13.9 %


(11.5-14.5)


 


Immature Granulocyte % (Auto)  0.1 % 


 


Immature Granulocyte # (Auto)


  


  0.01 K/uL


(0.00-0.02)











Medications Administered











 Medications


  (Trade)  Dose


 Ordered  Sig/Dave


 Route  Start Time


 Stop Time Status Last Admin


Dose Admin


 


 Albuterol/


 Ipratropium


  (Duoneb)  3 ml  NOW  STAT


 INH  8/12/18 02:54


 8/12/18 02:56 DC 8/12/18 02:56


3 ML


 


 Lorazepam


  (Ativan Inj)  1 mg  NOW  STAT


 IV  8/12/18 02:54


 8/12/18 02:56 DC 8/12/18 03:15


1 MG


 


 Sodium Chloride  1,000 ml @ 


 999 mls/hr  Q1H1M STAT


 IV  8/12/18 02:54


 8/12/18 03:54 DC 8/12/18 03:15


999 MLS/HR


 


 Dexamethasone


 Sodium Phosphate


  (Decadron Inj)  10 mg  STK-MED ONCE


 .ROUTE  8/12/18 03:11


 8/12/18 03:12 DC 8/12/18 03:16


10 MG











ED Course


Prior records/ancillary studies reviewed.


Triage Nursing notes reviewed.


Additional history obtained from the family.





The patient's history was concerning for respiratory difficulties.





Differential diagnosis:


Etiologies such as infections, reactive airway disease, pneumonia, pneumothorax

, COPD, CHF, cardiac ischemia, pulmonary embolism, musculoskeletal, 

gastrointestinal, as well as others were entertained.





Physical examination:


As above. 





ER treatment provided:


Nebulizer, Decadron, Ativan


On reassessment the patient felt better.





Diagnostic interpretation by me:


The electrocardiogram was negative for acute ischemic or pathologic change.  

Normal sinus, normal intervals, no acute ST-T wave changes.  Impression normal 

sinus rhythm interpreted by myself





I think arrhythmia is unlikely. EKG shows normal sinus rhythm with no interval 

abnormalities such as QT prolongation or WPW. There are no findings to suggest 

Brugada syndrome. Cardiac monitoring in the emergency department reveals no 

tachycardic or bradycardic dysrhythmia. Hypertrophic cardiomyopathy was 

considered but there are no clear historical elements pointing toward this. EKG 

is not suggestive. The QRS voltage is not extremely large and there are no 

suggestive Q waves.





The labs revealed no worrisome leukocytosis.  Negative hCG.  Negative troponin





Imaging studies:


Chest x-ray with no acute consolidation, pneumothorax free of my interpretation








This appears to be consistent with asthmatic bronchitis.  Patient has a history 

of asthma and bronchitis.  Symptoms were similar.  Patient was neurovascularly 

and neurologically intact.  She was afebrile and nontoxic appearing.  Lungs are 

reassessed and improved.  She was not hypoxic.  She was not retracting.  She is 

advised to take medications as directed, rest, stay well-hydrated and to follow-

up family care in a few days or here in the ER sooner for fevers, chest pain, 

difficulty breathing, worsening signs or symptoms or as needed.  By the 

evaluation outlined above emergent etiologies such as CHF, cardiac ischemia, 

pulmonary embolism, pneumonia, pneumothorax, musculoskeletal, serious bacterial 

infections, as well as others were deemed relatively unlikely.





The pt informed about the findings as listed above. All questions were answered 

and  pleased with the treatment. Return instructions were outlined and the 

patient was discharged in stable condition.





Outpatient prescription management:


Prednisone





Referral:


The patient was referred back to their primary care physician for follow-up in 

2 to 3 days for a recheck of the current condition.





Case reviewed with my attending





The chart was completed utilizing Dragon Speech voice recognition software.   

Grammatical errors, random word insertions, pronoun errors, and incomplete 

sentences are an occassional consequence of this system due to software 

limitations, ambient noise, and hardware issues.  Any formal questions or 

concerns about the content, text, or information contained within the body of 

this dictation should be directly addressed to the physician assistant for 

clarification.





Medical Decision


As above





Medication Reconcilliation


Current Medication List:  was personally reviewed by me





Blood Pressure Screening


Patient's blood pressure:  Normal blood pressure





Impression





 Primary Impression:  


 Asthmatic bronchitis





Departure Information


Dispostion


Home / Self-Care





Condition


GOOD





Referrals


Maria Alejandra Mejia D.OGarrett (PCP)





Patient Instructions


My [facility]





Additional Instructions





Albuterol Inhaler: Take 2 puffs four times daily for five days, then as needed.





Prednisone 50mg:  Once daily until the prescription is finished.  It is best to 

take this earlier in the day as some patients note occasional difficulty 

falling asleep when taken in the late evening.








Acetaminophen(Tylenol) may be used for fever or pain.  Use 1000mg every six 

hours as needed.  Avoid using more than 3000mg in a 24 hour period.


(AND/OR)


Ibuprofen(Motrin, Advil) may be used for fever or pain.  Use 600mg every six 

hours as needed.  Take with food.  Avoid using more than 2400mg in a 24 hour 

period.  Do not use 2400mg per day for more than three consecutive days without 

physician direction.  Prolonged inappropriate use can lead to stomach upset or 

ulcers.





Rest and drink plenty of fluids. 





Avoid smoke/smoking, fumes, dust, or any triggers in the past that may have 

affected your breathing.





Continue current medications.





Return to the ER for chest pain, difficulty breathing, fevers, vomiting, 

worsening of your condition, or as needed.





Follow up with your primary physician this week for a recheck of your current 

condition.





Problem Qualifiers








 Primary Impression:  


 Asthmatic bronchitis


 Asthma severity:  moderate  Asthma persistence:  persistent  Asthma 

complication type:  with acute exacerbation  Qualified Codes:  J45.41 - 

Moderate persistent asthma with (acute) exacerbation

## 2023-04-28 NOTE — DIAGNOSTIC IMAGING REPORT
CHEST 2 VIEWS ROUTINE



CLINICAL HISTORY: Shortness of breath. Wheezing.    



COMPARISON STUDY:  Chest radiograph July 8, 2018.



FINDINGS: Lung volumes are normal. There is no pneumothorax or pleural effusion.

Cardiac size is normal. Mediastinal contours are normal. There is no evidence

for pulmonary edema. 



IMPRESSION:  No acute cardiopulmonary findings. 









Electronically signed by:  Joshua Butt M.D.

8/12/2018 7:22 AM



Dictated Date/Time:  8/12/2018 7:21 AM No